# Patient Record
Sex: FEMALE | Race: WHITE | HISPANIC OR LATINO | URBAN - METROPOLITAN AREA
[De-identification: names, ages, dates, MRNs, and addresses within clinical notes are randomized per-mention and may not be internally consistent; named-entity substitution may affect disease eponyms.]

---

## 2024-09-09 ENCOUNTER — APPOINTMENT (RX ONLY)
Dept: URBAN - METROPOLITAN AREA CLINIC 84 | Facility: CLINIC | Age: 49
Setting detail: DERMATOLOGY
End: 2024-09-09

## 2024-09-09 DIAGNOSIS — L57.8 OTHER SKIN CHANGES DUE TO CHRONIC EXPOSURE TO NONIONIZING RADIATION: ICD-10-CM

## 2024-09-09 DIAGNOSIS — M67.4 GANGLION: ICD-10-CM | Status: WORSENING

## 2024-09-09 PROBLEM — M67.441 GANGLION, RIGHT HAND: Status: ACTIVE | Noted: 2024-09-09

## 2024-09-09 PROCEDURE — ? PHOTO-DOCUMENTATION

## 2024-09-09 PROCEDURE — ? OBSERVATION AND MEASURE

## 2024-09-09 PROCEDURE — ? DIAGNOSIS COMMENT

## 2024-09-09 PROCEDURE — ? ADDITIONAL NOTES

## 2024-09-09 PROCEDURE — 99203 OFFICE O/P NEW LOW 30 MIN: CPT

## 2024-09-09 PROCEDURE — ? COUNSELING

## 2024-09-09 ASSESSMENT — LOCATION ZONE DERM
LOCATION ZONE: LIP
LOCATION ZONE: FINGER

## 2024-09-09 ASSESSMENT — LOCATION SIMPLE DESCRIPTION DERM
LOCATION SIMPLE: RIGHT MIDDLE FINGER
LOCATION SIMPLE: LEFT LIP

## 2024-09-09 ASSESSMENT — LOCATION DETAILED DESCRIPTION DERM
LOCATION DETAILED: LEFT INFERIOR VERMILION LIP
LOCATION DETAILED: RIGHT MID DORSAL MIDDLE FINGER

## 2024-09-09 NOTE — PROCEDURE: ADDITIONAL NOTES
Render Risk Assessment In Note?: no
Detail Level: Simple
Additional Notes: Patient states it has been there for 3 months it has grown pretty rapidly.\\nPatient states it can be bothersome while working and doing everyday task.\\nPatient to be referred to orthopedic hand surgeon for removal.\\nReferral sent to Dr Hawthorne at [facility]
Additional Notes: In the mornings patient to use Dr mo on lips.\\Blaine the evenings before bed patient to use otc hydrocortisone cream \\nLiquid nitrogen in reserve
Additional Notes: patient denies trauma, arthritis, etc